# Patient Record
Sex: MALE | Race: ASIAN | NOT HISPANIC OR LATINO
[De-identification: names, ages, dates, MRNs, and addresses within clinical notes are randomized per-mention and may not be internally consistent; named-entity substitution may affect disease eponyms.]

---

## 2017-05-18 ENCOUNTER — APPOINTMENT (OUTPATIENT)
Dept: UROLOGY | Facility: CLINIC | Age: 37
End: 2017-05-18

## 2017-05-18 DIAGNOSIS — F17.200 NICOTINE DEPENDENCE, UNSPECIFIED, UNCOMPLICATED: ICD-10-CM

## 2017-05-18 DIAGNOSIS — Z78.9 OTHER SPECIFIED HEALTH STATUS: ICD-10-CM

## 2017-05-20 LAB
APPEARANCE: CLEAR
BACTERIA: NEGATIVE
BILIRUBIN URINE: NEGATIVE
BLOOD URINE: NEGATIVE
COLOR: YELLOW
GLUCOSE QUALITATIVE U: NORMAL MG/DL
KETONES URINE: NEGATIVE
LEUKOCYTE ESTERASE URINE: NEGATIVE
MICROSCOPIC-UA: NORMAL
NITRITE URINE: NEGATIVE
PH URINE: 6
PROTEIN URINE: NEGATIVE MG/DL
RED BLOOD CELLS URINE: 6 /HPF
SPECIFIC GRAVITY URINE: 1.03
SQUAMOUS EPITHELIAL CELLS: 0 /HPF
UROBILINOGEN URINE: NORMAL MG/DL
WHITE BLOOD CELLS URINE: 0 /HPF

## 2017-05-24 LAB
APPEARANCE: CLEAR
BACTERIA: NEGATIVE
BILIRUBIN URINE: NEGATIVE
BLOOD URINE: NEGATIVE
COLOR: YELLOW
CORE LAB FLUID CYTOLOGY: NORMAL
GLUCOSE QUALITATIVE U: NORMAL MG/DL
KETONES URINE: NEGATIVE
LEUKOCYTE ESTERASE URINE: NEGATIVE
MICROSCOPIC-UA: NORMAL
NITRITE URINE: NEGATIVE
PH URINE: 6
PROTEIN URINE: NEGATIVE MG/DL
RED BLOOD CELLS URINE: 1 /HPF
SPECIFIC GRAVITY URINE: 1.01
SQUAMOUS EPITHELIAL CELLS: 0 /HPF
UROBILINOGEN URINE: NORMAL MG/DL
WHITE BLOOD CELLS URINE: 0 /HPF

## 2017-06-26 ENCOUNTER — APPOINTMENT (OUTPATIENT)
Dept: UROLOGY | Facility: CLINIC | Age: 37
End: 2017-06-26

## 2017-07-10 ENCOUNTER — APPOINTMENT (OUTPATIENT)
Dept: UROLOGY | Facility: CLINIC | Age: 37
End: 2017-07-10

## 2017-09-11 ENCOUNTER — APPOINTMENT (OUTPATIENT)
Dept: UROLOGY | Facility: CLINIC | Age: 37
End: 2017-09-11
Payer: MEDICAID

## 2017-09-11 ENCOUNTER — OUTPATIENT (OUTPATIENT)
Dept: OUTPATIENT SERVICES | Facility: HOSPITAL | Age: 37
LOS: 1 days | End: 2017-09-11
Payer: MEDICAID

## 2017-09-11 VITALS
SYSTOLIC BLOOD PRESSURE: 119 MMHG | HEART RATE: 65 BPM | WEIGHT: 160 LBS | DIASTOLIC BLOOD PRESSURE: 73 MMHG | HEIGHT: 68 IN | TEMPERATURE: 98 F | RESPIRATION RATE: 15 BRPM | BODY MASS INDEX: 24.25 KG/M2

## 2017-09-11 DIAGNOSIS — Z00.00 ENCOUNTER FOR GENERAL ADULT MEDICAL EXAMINATION W/OUT ABNORMAL FINDINGS: ICD-10-CM

## 2017-09-11 DIAGNOSIS — Z30.09 ENCOUNTER FOR OTHER GENERAL COUNSELING AND ADVICE ON CONTRACEPTION: ICD-10-CM

## 2017-09-11 DIAGNOSIS — R35.0 FREQUENCY OF MICTURITION: ICD-10-CM

## 2017-09-11 PROCEDURE — 55250 REMOVAL OF SPERM DUCT(S): CPT

## 2017-09-11 RX ORDER — OXYCODONE AND ACETAMINOPHEN 5; 325 MG/1; MG/1
5-325 TABLET ORAL
Qty: 15 | Refills: 0 | Status: ACTIVE | COMMUNITY
Start: 2017-09-11 | End: 1900-01-01

## 2017-09-12 DIAGNOSIS — Z30.2 ENCOUNTER FOR STERILIZATION: ICD-10-CM

## 2017-09-13 LAB — CORE LAB BIOPSY: NORMAL

## 2017-11-07 ENCOUNTER — APPOINTMENT (OUTPATIENT)
Dept: HUMAN REPRODUCTION | Facility: CLINIC | Age: 37
End: 2017-11-07

## 2021-12-10 ENCOUNTER — APPOINTMENT (OUTPATIENT)
Dept: CARDIOLOGY | Facility: CLINIC | Age: 41
End: 2021-12-10
Payer: COMMERCIAL

## 2021-12-10 ENCOUNTER — NON-APPOINTMENT (OUTPATIENT)
Age: 41
End: 2021-12-10

## 2021-12-10 VITALS
BODY MASS INDEX: 22.75 KG/M2 | WEIGHT: 168 LBS | SYSTOLIC BLOOD PRESSURE: 124 MMHG | RESPIRATION RATE: 15 BRPM | HEIGHT: 72 IN | DIASTOLIC BLOOD PRESSURE: 71 MMHG | OXYGEN SATURATION: 95 % | HEART RATE: 70 BPM | TEMPERATURE: 98.1 F

## 2021-12-10 DIAGNOSIS — R07.89 OTHER CHEST PAIN: ICD-10-CM

## 2021-12-10 PROCEDURE — 99204 OFFICE O/P NEW MOD 45 MIN: CPT

## 2021-12-10 PROCEDURE — 93000 ELECTROCARDIOGRAM COMPLETE: CPT

## 2021-12-15 NOTE — HISTORY OF PRESENT ILLNESS
[FreeTextEntry1] : 41 year old male smoker with no significant PMH presents for evaluation of CP.  Patient reports that for the past 6 months he has been experiencing left sided chest pressure, not associated with activities, non-tender to touch, not worse with inspiration, lasting hours.  It occurs almost every day, and can last whole day, especially when he is pre-occupied with the discomfort.  He also reports occasional L. chest sharp pain lasting 10 -20 seconds.  Pt reports exertional palpitations. Pt denies SOB.  Pt denies h/o syncope.  He had a CXR a few months ago and it was unremarkable.  I advised patient to undergo an echocardiogram.  I will obtain insurance authorization (CP, possible myocarditis/pericarditis).\par

## 2022-01-06 ENCOUNTER — APPOINTMENT (OUTPATIENT)
Dept: CARDIOLOGY | Facility: CLINIC | Age: 42
End: 2022-01-06